# Patient Record
Sex: MALE | Race: WHITE
[De-identification: names, ages, dates, MRNs, and addresses within clinical notes are randomized per-mention and may not be internally consistent; named-entity substitution may affect disease eponyms.]

---

## 2019-08-30 ENCOUNTER — HOSPITAL ENCOUNTER (OUTPATIENT)
Dept: HOSPITAL 59 - HOP | Age: 49
Discharge: HOME | End: 2019-08-30
Attending: INTERNAL MEDICINE
Payer: COMMERCIAL

## 2019-08-30 DIAGNOSIS — G62.9: ICD-10-CM

## 2019-08-30 DIAGNOSIS — K57.30: ICD-10-CM

## 2019-08-30 DIAGNOSIS — I10: ICD-10-CM

## 2019-08-30 DIAGNOSIS — E78.00: ICD-10-CM

## 2019-08-30 DIAGNOSIS — D12.5: ICD-10-CM

## 2019-08-30 DIAGNOSIS — D12.2: ICD-10-CM

## 2019-08-30 DIAGNOSIS — D12.4: ICD-10-CM

## 2019-08-30 DIAGNOSIS — E11.9: ICD-10-CM

## 2019-08-30 DIAGNOSIS — Z12.11: Primary | ICD-10-CM

## 2019-09-03 NOTE — OPERATIVE NOTE
SURGEON: Bertha Kidd MD  



OPERATION: COLONOSCOPY. 



INDICATIONS: This is a 49-year-old male with average risk for colorectal cancer 
who presented for screening colonoscopy. 



POSTOPERATIVE DIAGNOSES:

1. Left-sided colonic diverticulosis. 

2. A 3 mm sessile polyp in the descending colon that was removed by cold biopsy 
forceps. 

3. Two 5 mm ascending colon polyps that were removed by cold snare. 

4. A 6 mm sessile polyp in the sigmoid colon that was removed snare. 



ANESTHESIA: Sedation is per Anesthesia. Pulse oximetry was monitored throughout 
the procedure to maintain O2 saturation of 90% or greater. Supplemental oxygen 
was administered via nasal cannula. Cardiac and vital signs were monitored 
throughout the duration of the procedure, and they were stable. 



The procedure of colonoscopy and risks and alternatives of the procedure, 
including the risk of bleeding and perforation, among others, were explained to 
the patient who voiced understanding and agreed to have the procedure done. 
Physical examination was performed, and the patient was found stable for 
sedation. 



PROCEDURE: The patient was placed in the left lateral position. Sedation was 
initiated. A digital rectal exam was performed and showed some mild external 
hemorrhoids with no palpable rectal masses. An Olympus PCF-180AL colonoscope was
then inserted into the rectum under direct visualization. It was advanced to the
cecum without difficulty. The ileocecal valve and appendiceal orifice were 
identified and photographed. The colonic mucosa was carefully examined upon 
introduction of the colonoscope. There were scattered diverticula noted in the 
sigmoid and descending colon. In the descending colon was a 3 mm sessile polyp 
that was noted and was removed by cold biopsy forceps. In the ascending colon 
were two 5 mm sessile polyps that were noted and they were removed by cold 
snare. The colonoscope was then withdrawn while carefully examining the colonic 
mucosal surfaces. The cecum, the rest of the ascending colon, transverse colon, 
and descending colon mucosa revealed no other lesions. In the sigmoid colon was 
a 6 mm sessile polyp that was noted and was removed by cold snare. There were no
other lesions noted. In the rectum, retroflexion was performed and grade 1 
internal hemorrhoids were noted. The colonoscope was then withdrawn and the 
procedure was terminated. The patient tolerated the procedure well without any 
immediate complications. The patient remained with stable vital signs and was 
transferred to the recovery room.



RECOMMENDATIONS: 

1. The patient should be on a high-fiber diet. 

2. The patient is to have a repeat colonoscopy for surveillance in 3 years.



Thank you for allowing me to participate in the care of your patient. 

ADELINA